# Patient Record
Sex: FEMALE | Race: BLACK OR AFRICAN AMERICAN | NOT HISPANIC OR LATINO | URBAN - METROPOLITAN AREA
[De-identification: names, ages, dates, MRNs, and addresses within clinical notes are randomized per-mention and may not be internally consistent; named-entity substitution may affect disease eponyms.]

---

## 2020-12-29 ENCOUNTER — APPOINTMENT (RX ONLY)
Dept: URBAN - METROPOLITAN AREA CLINIC 15 | Facility: CLINIC | Age: 45
Setting detail: DERMATOLOGY
End: 2020-12-29

## 2020-12-29 DIAGNOSIS — Z41.9 ENCOUNTER FOR PROCEDURE FOR PURPOSES OTHER THAN REMEDYING HEALTH STATE, UNSPECIFIED: ICD-10-CM

## 2021-03-25 ENCOUNTER — APPOINTMENT (RX ONLY)
Dept: URBAN - METROPOLITAN AREA CLINIC 15 | Facility: CLINIC | Age: 46
Setting detail: DERMATOLOGY
End: 2021-03-25

## 2021-03-25 DIAGNOSIS — Z41.9 ENCOUNTER FOR PROCEDURE FOR PURPOSES OTHER THAN REMEDYING HEALTH STATE, UNSPECIFIED: ICD-10-CM

## 2021-03-25 PROCEDURE — ? MIRADRY

## 2021-03-25 NOTE — HPI: OTHER
Condition:: First Miradry procedure on her armpits.
Please Describe Your Condition:: is being seen for a chief complaint of First Miradry procedure on her armpits. The patient would like to eliminate the excessive sweating and the odor from her armpits with this procedure.

## 2021-03-25 NOTE — PROCEDURE: MIRADRY
Template Size Loation 2: 80 x 160
Consent: Written consent obtained, risks reviewed including but not limited to crusting, scabbing, blistering, scarring, darker or lighter pigmentary change, incomplete improvement of hyperhidrosis, wrinkles.
Detail Level: Detailed
[id number]
Initial Location: Right and Left Axilla
Anesthesia Type: 1% lidocaine with epinephrine
Treatment Number Location 4: 1
Post-Care Instructions: I reviewed with the patient in detail post-care instructions. Patient should avoid sun until area fully healed. \\n\\nI emphasized the importance of cleansing both axillae with an antiseptic Soap (Hibiclens Cleanser) twice a day and to apply antibiotic ointment twice a day for 7 days. Besides that I insisted on the importance of taking the anti- inflammatory NSAID Ibuprofens three to four times a day for 5 to 7 days, and applying the ice pack (wrapped in a towel) as frequent as possible in the first 48 to 96 hours. \\n\\nThe patient will take Cephalexin 500 mg capsule, 1 po QID for 7 days as a prophylaxis.
Treatment Energy Level: 5
Comments: The procedure was well tolerated, with no complications. \\nBiotip [id number]
Anesthesia Volume In Cc: [facility]
Number Of Placements Location 2: 45